# Patient Record
Sex: MALE | Race: WHITE | NOT HISPANIC OR LATINO | Employment: PART TIME | URBAN - METROPOLITAN AREA
[De-identification: names, ages, dates, MRNs, and addresses within clinical notes are randomized per-mention and may not be internally consistent; named-entity substitution may affect disease eponyms.]

---

## 2022-12-06 ENCOUNTER — OFFICE VISIT (OUTPATIENT)
Dept: FAMILY MEDICINE CLINIC | Facility: CLINIC | Age: 22
End: 2022-12-06

## 2022-12-06 VITALS
BODY MASS INDEX: 26 KG/M2 | RESPIRATION RATE: 16 BRPM | OXYGEN SATURATION: 98 % | WEIGHT: 220.2 LBS | DIASTOLIC BLOOD PRESSURE: 86 MMHG | HEART RATE: 96 BPM | SYSTOLIC BLOOD PRESSURE: 116 MMHG | HEIGHT: 77 IN

## 2022-12-06 DIAGNOSIS — Z02.89 ENCOUNTER FOR PHYSICAL EXAMINATION RELATED TO EMPLOYMENT: Primary | ICD-10-CM

## 2022-12-06 DIAGNOSIS — Z87.898 HISTORY OF SYNCOPE IN CHILDHOOD: ICD-10-CM

## 2022-12-06 DIAGNOSIS — Z00.00 ENCOUNTER FOR SCREENING AND PREVENTATIVE CARE: ICD-10-CM

## 2022-12-06 NOTE — PROGRESS NOTES
New Patient Outpatient Note    HPI:     Sandra Dickey, 20 y o  male  presents today as a new patient  Patient presents to establish care and is interested in obtaining a physical for the 's exam for [facility]  The patient does note a distant history of vasovagal events when he was younger, he has not had any recent events over the last 2-3 years  Mostly the stimulus for these episodes are needle/blood  Overall he has no other past medical history and feels healthy  He does not smoke, and uses alcohol rarely  He has no other drug use  Family Hx  UTD in chart    Past Medical History:   Diagnosis Date   • Vasovagal episode         Past Surgical History:   Procedure Laterality Date   • APPENDECTOMY          No current outpatient medications on file  SOCIAL:   Rent/Own: Parents  Currently living with: mom, dad, sister  Stable food: Yes  Safe At Home: Yes  Hobbies: video games  Profession/ employment: event/ arena security  Restriction to medical procedures:  none    SEXUAL HISTORY:   Preference:  Women  Sexually Active: Not currently  Birth Control: none    Psychological History  Psychiatric history: none  History of inpatient:  None  Current Therapy/ Provider:  None  Current Medications:  None    Substance History  Smoking: None  Alcohol Use: 2-3 drinks per month  Substance use:  none    ROS:   Review of Systems   Constitutional: Negative for chills, fever and unexpected weight change  HENT: Negative for congestion, ear discharge, ear pain, hearing loss, postnasal drip, rhinorrhea, sinus pressure, sinus pain and sore throat  Eyes: Negative for pain, discharge and visual disturbance  Respiratory: Negative for cough, chest tightness and shortness of breath  Cardiovascular: Negative for chest pain and palpitations  Gastrointestinal: Positive for blood in stool  Negative for abdominal distention, abdominal pain, constipation, diarrhea, nausea and vomiting     Genitourinary: Negative for dysuria and frequency  Skin: Negative for rash and wound  Neurological: Negative for dizziness, syncope, light-headedness and headaches  Psychiatric/Behavioral: Negative for self-injury and suicidal ideas  OBJECTIVE  Vitals:    12/06/22 1421   BP: 116/86   Pulse: 96   Resp: 16   SpO2: 98%        Physical Exam  Constitutional:       General: He is not in acute distress  Appearance: Normal appearance  He is not ill-appearing, toxic-appearing or diaphoretic  HENT:      Head: Normocephalic and atraumatic  Right Ear: Tympanic membrane, ear canal and external ear normal  There is no impacted cerumen  Left Ear: Tympanic membrane, ear canal and external ear normal  There is no impacted cerumen  Nose: Nose normal  No congestion or rhinorrhea  Mouth/Throat:      Mouth: Mucous membranes are moist       Pharynx: Oropharynx is clear  No oropharyngeal exudate or posterior oropharyngeal erythema  Eyes:      General:         Right eye: No discharge  Left eye: No discharge  Extraocular Movements: Extraocular movements intact  Pupils: Pupils are equal, round, and reactive to light  Cardiovascular:      Rate and Rhythm: Normal rate and regular rhythm  Pulses: Normal pulses  Heart sounds: Normal heart sounds  No murmur heard  No friction rub  No gallop  Pulmonary:      Effort: Pulmonary effort is normal  No respiratory distress  Breath sounds: Normal breath sounds  No stridor  No wheezing, rhonchi or rales  Abdominal:      General: Bowel sounds are normal  There is no distension  Palpations: Abdomen is soft  Tenderness: There is no abdominal tenderness  Musculoskeletal:         General: Normal range of motion  Cervical back: Neck supple  No tenderness  Lymphadenopathy:      Cervical: No cervical adenopathy  Skin:     General: Skin is warm  Capillary Refill: Capillary refill takes less than 2 seconds  Comments: Dry skin  Multiple moles on abdomen and face  Neurological:      Mental Status: He is alert  ASSESSMENT AND PLAN   Orlin Archibald was seen today for establish care and physical exam   Diagnoses and all orders for this visit:    Encounter for physical examination related to employment  Encounter for screening and preventative care  History of syncope in childhood  Patient presented today for preemployment evaluation with the 's office  He needs to be cleared for physical fitness testing  According to the patient he has had a history of vasovagal syncope in childhood, his last episode was with presence of needles or blood about 2 to 3 years ago  He has not had any new episodes or concerns with chest pain, shortness of breath, nausea, vomiting, lightheadedness, dizziness, arrhythmia or palpitations  Patient was told to hydrate well prior to physical fitness and to monitor his symptoms closely during the examination  If he feels that he is going to pass out or has any other cardiac/pulmonary related issues he should return as soon as possible for further evaluation  I do not believe he requires any cardiac clearance    Paperwork signed for patient to be able to attempt physical exam        Wally Beckett DO  [facility]  12/6/2022 2:55 PM

## 2023-08-31 ENCOUNTER — OFFICE VISIT (OUTPATIENT)
Dept: FAMILY MEDICINE CLINIC | Facility: CLINIC | Age: 23
End: 2023-08-31
Payer: COMMERCIAL

## 2023-08-31 VITALS
OXYGEN SATURATION: 98 % | RESPIRATION RATE: 16 BRPM | SYSTOLIC BLOOD PRESSURE: 120 MMHG | HEIGHT: 77 IN | DIASTOLIC BLOOD PRESSURE: 80 MMHG | WEIGHT: 238 LBS | BODY MASS INDEX: 28.1 KG/M2 | HEART RATE: 98 BPM

## 2023-08-31 DIAGNOSIS — Z13.0 SCREENING, ANEMIA, DEFICIENCY, IRON: ICD-10-CM

## 2023-08-31 DIAGNOSIS — Z13.1 SCREENING FOR DIABETES MELLITUS: ICD-10-CM

## 2023-08-31 DIAGNOSIS — R07.81 PLEURITIC CHEST PAIN: Primary | ICD-10-CM

## 2023-08-31 DIAGNOSIS — M25.511 ACUTE PAIN OF RIGHT SHOULDER: ICD-10-CM

## 2023-08-31 DIAGNOSIS — R07.89 CHEST WALL PAIN: ICD-10-CM

## 2023-08-31 PROCEDURE — 99214 OFFICE O/P EST MOD 30 MIN: CPT | Performed by: FAMILY MEDICINE

## 2023-08-31 NOTE — PROGRESS NOTES
Outpatient Note- acute    HPI:     Emerald Espinoza , 24 y.o. male  presents today for follow up of right sided chest pain. The patient has been having 3 day history of increased pain or discomfort with deep breath. The patient has not been working out since the episode occurred. He denies any significant trauma or workout. Onset of pain: 3 day  Intensity of pain:  8-9/10  Location of Pain: right shoulder/ right pectoral  Radiation: into right neck  Character of Pain: sharp stabbing pain  Constant/ intermittent: aching in shoulder underlying, intermittent stabbing  Exacerbating factors: laying down and sitting at rest  Relieving Factors: standing, heating  Associated Symptoms:    -  Erythema, Numbness, tingling  -  Nausea, vomiting  -  Decreased ROM  -  Decreased Strength    Inciting Event/ Trauma: None  Progression: same, moved into a different spot  Previous Episodes: About 2 months ago, right pectoral  Specialist Follow up: None  Hx of Physical Therapy:  none  Prior medications:  None    Past Medical History:   Diagnosis Date   • Vasovagal episode       ROS:   Review of Systems   Constitutional: Negative for chills and fever. HENT: Negative for congestion, ear discharge, ear pain, postnasal drip, rhinorrhea, sinus pressure, sinus pain and sore throat. Respiratory: Negative for cough, chest tightness, shortness of breath and wheezing. Cardiovascular: Positive for chest pain. Pleuritic chest pain   Gastrointestinal: Negative for abdominal distention, constipation, diarrhea, nausea and vomiting. Musculoskeletal: Positive for arthralgias and myalgias. OBJECTIVE  Vitals:    08/31/23 0755   BP: 120/80   Pulse: 98   Resp: 16   SpO2: 98%      Physical Exam  Constitutional:       General: He is not in acute distress. Appearance: He is well-developed. He is not ill-appearing, toxic-appearing or diaphoretic. HENT:      Head: Normocephalic and atraumatic.       Right Ear: Tympanic membrane, ear canal and external ear normal.      Left Ear: Tympanic membrane, ear canal and external ear normal.      Ears:      Comments: Cerumen, non impacted right       Nose: No congestion or rhinorrhea. Mouth/Throat:      Mouth: Mucous membranes are moist.      Pharynx: Oropharynx is clear. No oropharyngeal exudate or posterior oropharyngeal erythema. Eyes:      Extraocular Movements: Extraocular movements intact. Pupils: Pupils are equal, round, and reactive to light. Cardiovascular:      Rate and Rhythm: Normal rate and regular rhythm. No extrasystoles are present. Heart sounds: Normal heart sounds. Heart sounds not distant. No murmur heard. No systolic murmur is present. No diastolic murmur is present. No friction rub. No gallop. No S3 or S4 sounds. Pulmonary:      Effort: Pulmonary effort is normal. No tachypnea or respiratory distress. Breath sounds: Examination of the right-lower field reveals decreased breath sounds. Decreased breath sounds present. No wheezing, rhonchi or rales. Musculoskeletal:         General: No swelling or tenderness. Normal range of motion. Cervical back: Normal range of motion and neck supple. Comments: Negative for empty can test, Mustafa, and Jurgenson's test   Lymphadenopathy:      Cervical: No cervical adenopathy. Skin:     General: Skin is warm. Capillary Refill: Capillary refill takes less than 2 seconds. Neurological:      Mental Status: He is alert. Motor: No weakness. ASSESSMENT AND PLAN   Aleksandra Escobedo was seen today for chest pain. Diagnoses and all orders for this visit:    Pleuritic chest pain  Chest wall pain  Acute pain of right shoulder  Unknown etiology of right shoulder pain and pleuritic chest pain. The patient does not have a significant history of clots or previous trauma. Due to his pleuritic chest pain will order stat D-dimer for further evaluation of pulmonary embolism.   Low likelihood but must be ruled out.  He denies any tachycardia, shortness of breath, tachypnea, fever, chills, or palpitations. Patient does not have any pain on palpation or range of motion. His specific testing for rotator cuff, impingement, or biceps tendinitis was negative. Will obtain D-dimer, if positive will send for stat CT scan. Red flag symptoms were reviewed with the patient for if he has any of these he should go directly to the emergency room. -     D-dimer, quantitative; Future  -     Comprehensive metabolic panel; Future  -     CBC and differential; Future    Screening for diabetes mellitus  Screening, anemia, deficiency, iron  We will obtain baseline labs for patient. He is not fasting, will not obtain lipid panel. Fasting glucose will not be accurate. If concerned about elevations in glucose will have him follow-up for fasting lipid panel and glucose. -     Comprehensive metabolic panel;  Future        -     CBC and differential; Future      Jorden Jean DO  [facility]  8/31/2023 8:39 AM

## 2023-09-01 ENCOUNTER — TELEPHONE (OUTPATIENT)
Dept: FAMILY MEDICINE CLINIC | Facility: CLINIC | Age: 23
End: 2023-09-01

## 2023-09-01 NOTE — TELEPHONE ENCOUNTER
Called pateint and reviewed D dimer. Negative testing. No evidence of PE on labs. Therefore will recommend he monitor the area, rest the best he can. He can attempt advil or tylenol for the next several days and will follow up with me next week to see how he is doing.      Apolinar Alcocer DO  [facility]  9/1/2023 5:03 PM

## 2023-09-05 PROCEDURE — 93000 ELECTROCARDIOGRAM COMPLETE: CPT | Performed by: FAMILY MEDICINE
